# Patient Record
Sex: FEMALE | Race: WHITE | URBAN - METROPOLITAN AREA
[De-identification: names, ages, dates, MRNs, and addresses within clinical notes are randomized per-mention and may not be internally consistent; named-entity substitution may affect disease eponyms.]

---

## 2017-06-22 PROBLEM — Z00.00 ENCOUNTER FOR PREVENTIVE HEALTH EXAMINATION: Status: ACTIVE | Noted: 2017-06-22

## 2017-06-28 ENCOUNTER — OUTPATIENT (OUTPATIENT)
Dept: OUTPATIENT SERVICES | Facility: HOSPITAL | Age: 47
LOS: 1 days | End: 2017-06-28
Payer: COMMERCIAL

## 2017-06-28 PROCEDURE — 70551 MRI BRAIN STEM W/O DYE: CPT | Mod: 26

## 2017-06-29 ENCOUNTER — APPOINTMENT (OUTPATIENT)
Dept: CARDIOLOGY | Facility: CLINIC | Age: 47
End: 2017-06-29

## 2017-07-11 ENCOUNTER — APPOINTMENT (OUTPATIENT)
Dept: CARDIOLOGY | Facility: CLINIC | Age: 47
End: 2017-07-11

## 2017-07-17 ENCOUNTER — APPOINTMENT (OUTPATIENT)
Dept: CARDIOLOGY | Facility: CLINIC | Age: 47
End: 2017-07-17

## 2017-08-04 ENCOUNTER — APPOINTMENT (OUTPATIENT)
Dept: CARDIOLOGY | Facility: CLINIC | Age: 47
End: 2017-08-04

## 2018-04-06 ENCOUNTER — OUTPATIENT (OUTPATIENT)
Dept: OUTPATIENT SERVICES | Facility: HOSPITAL | Age: 48
LOS: 1 days | End: 2018-04-06
Payer: COMMERCIAL

## 2018-04-06 PROCEDURE — 76770 US EXAM ABDO BACK WALL COMP: CPT | Mod: 26

## 2018-04-06 PROCEDURE — 76856 US EXAM PELVIC COMPLETE: CPT | Mod: 26

## 2018-10-09 ENCOUNTER — OUTPATIENT (OUTPATIENT)
Dept: OUTPATIENT SERVICES | Facility: HOSPITAL | Age: 48
LOS: 1 days | End: 2018-10-09
Payer: COMMERCIAL

## 2018-10-09 PROCEDURE — 76770 US EXAM ABDO BACK WALL COMP: CPT | Mod: 26

## 2018-10-09 PROCEDURE — 76856 US EXAM PELVIC COMPLETE: CPT | Mod: 26

## 2018-11-26 ENCOUNTER — OUTPATIENT (OUTPATIENT)
Dept: OUTPATIENT SERVICES | Facility: HOSPITAL | Age: 48
LOS: 1 days | End: 2018-11-26
Payer: COMMERCIAL

## 2018-11-26 PROCEDURE — 74178 CT ABD&PLV WO CNTR FLWD CNTR: CPT | Mod: 26

## 2018-12-13 ENCOUNTER — OUTPATIENT (OUTPATIENT)
Dept: OUTPATIENT SERVICES | Facility: HOSPITAL | Age: 48
LOS: 1 days | End: 2018-12-13
Payer: COMMERCIAL

## 2018-12-13 PROCEDURE — 76830 TRANSVAGINAL US NON-OB: CPT | Mod: 26

## 2018-12-13 PROCEDURE — 76856 US EXAM PELVIC COMPLETE: CPT | Mod: 26

## 2022-09-19 ENCOUNTER — NON-APPOINTMENT (OUTPATIENT)
Age: 52
End: 2022-09-19

## 2022-09-19 DIAGNOSIS — Z78.9 OTHER SPECIFIED HEALTH STATUS: ICD-10-CM

## 2022-09-19 DIAGNOSIS — Z83.3 FAMILY HISTORY OF DIABETES MELLITUS: ICD-10-CM

## 2022-09-19 DIAGNOSIS — E66.9 OBESITY, UNSPECIFIED: ICD-10-CM

## 2022-10-03 RX ORDER — METFORMIN HYDROCHLORIDE 1000 MG/1
1000 TABLET, COATED ORAL TWICE DAILY
Refills: 0 | Status: ACTIVE | COMMUNITY
Start: 2022-10-03

## 2022-10-03 RX ORDER — SIMVASTATIN 20 MG/1
20 TABLET, FILM COATED ORAL DAILY
Refills: 0 | Status: ACTIVE | COMMUNITY
Start: 2022-10-03

## 2022-10-03 RX ORDER — PIOGLITAZONE 30 MG/1
30 TABLET ORAL
Refills: 0 | Status: ACTIVE | COMMUNITY
Start: 2022-10-03

## 2022-10-03 RX ORDER — GLIMEPIRIDE 4 MG/1
4 TABLET ORAL TWICE DAILY
Refills: 0 | Status: ACTIVE | COMMUNITY
Start: 2022-10-03

## 2022-10-03 RX ORDER — LOSARTAN POTASSIUM 50 MG/1
50 TABLET, FILM COATED ORAL DAILY
Refills: 0 | Status: ACTIVE | COMMUNITY
Start: 2022-10-03

## 2022-10-04 ENCOUNTER — APPOINTMENT (OUTPATIENT)
Dept: ENDOCRINOLOGY | Facility: CLINIC | Age: 52
End: 2022-10-04

## 2022-10-04 VITALS
BODY MASS INDEX: 33.77 KG/M2 | WEIGHT: 210.13 LBS | HEART RATE: 68 BPM | DIASTOLIC BLOOD PRESSURE: 80 MMHG | SYSTOLIC BLOOD PRESSURE: 110 MMHG | HEIGHT: 66 IN

## 2022-10-04 DIAGNOSIS — E11.9 TYPE 2 DIABETES MELLITUS W/OUT COMPLICATIONS: ICD-10-CM

## 2022-10-04 PROCEDURE — 99213 OFFICE O/P EST LOW 20 MIN: CPT

## 2022-10-04 RX ORDER — METFORMIN HYDROCHLORIDE 1000 MG/1
1000 TABLET, COATED ORAL
Qty: 180 | Refills: 0 | Status: ACTIVE | COMMUNITY
Start: 2022-10-04 | End: 1900-01-01

## 2022-10-04 RX ORDER — PIOGLITAZONE HYDROCHLORIDE 30 MG/1
30 TABLET ORAL DAILY
Qty: 90 | Refills: 0 | Status: ACTIVE | COMMUNITY
Start: 2022-10-04 | End: 1900-01-01

## 2022-10-04 RX ORDER — GLIMEPIRIDE 4 MG/1
4 TABLET ORAL
Qty: 180 | Refills: 0 | Status: ACTIVE | COMMUNITY
Start: 2022-10-04 | End: 1900-01-01

## 2022-10-04 NOTE — ASSESSMENT
[Diabetes Foot Care] : diabetes foot care [Importance of Diet and Exercise] : importance of diet and exercise to improve glycemic control, achieve weight loss and improve cardiovascular health [Exercise/Effect on Glucose] : exercise/effect on glucose [Self Monitoring of Blood Glucose] : self monitoring of blood glucose [Hypoglycemia Management] : hypoglycemia management [FreeTextEntry1] : ASSESSMENT NOTES\par - Need to scan patients glucose tracker into file\par - BP is stable \par \par - Has slightly obese abdomen\par - Extremities are normal\par   parents have been staying with the patient for the past 3 months\par -Pt's glucometer  may not be working but needs to bring machine for Physician to inspect\par - Patient need prescription for blood test ([facility])\par - Needs the three antiidiabetic medications sent to [facility]\par \par VITALS\par - B.P. = 110/80 (good) - RUE / 110/70 - LUE (good)\par - H.R. = 68 bpm\par - Weight = 210.2 lbs (lost 5 pounds since last visit but needs to do better)\par - Height = 5'6" 3/4\par \par LABS SUMMARY\par Blood Test\par - Shows sugar was high at 7.8 (normal is <7) ; in April is was 6.9 so it went up 1%\par \par PLAN\par - Discussed with the patient the option of treatment and to add SGLT 2 inhibiters, but the patient is refusing that at present. She claimed that her sugar levels were higher than before due to the fact that her parents have been visiting her for the past 3 months.\par - She will continue on her current oral diabetic medications, with a strict compliance with diet and exercise\par - The importance of foot care and annual eye examination was discussed with the patient\par - Patient will have a repeat set of labs in about 3 months time and after that she will return to the office for a follow up visit.\par - Plan has been discussed with the patient..She is instructed to bring her glucometer on the next visit.

## 2022-10-04 NOTE — HISTORY OF PRESENT ILLNESS
[FreeTextEntry1] : ASSESSMENT\par - Metformin (1000 mg twice daily)\par - Glimepiride (4 mg twice daily)\par - Pioglitazone (30 mg once a day)\par - Simvastatin (80 mg once a day)\par - Losartan (100 mg once a day)\par \par - Patient is checking her sugar\par - Checks in the morning and night (2 times a day)\par - Patient skips days (every other day she is monitoring her sugar levels)\par - Did not bring monitor to appointment today\par \par AM Levels <100 mg/dL (after eating)\par PM Levels <120 mg/dl (after dinner)\par \par - Feels good overall\par - watches diet\par - walks for exercise\par - Has lost a little weight since her last visit\par - Appetite is normal\par - Working from 8-3 PM\par \par - Eyes are okay\par - Some knee pains, but no feet pains\par \par - No chest pains or shortness of breath\par - no nausea or vomiting\par \par - Goes to the bathroom 1-2 times a night\par - 5 x 16 oz water bottles a day (~80 oz of water)\par \par - Patient does not have insurance (prefers to stay with the current medication because she does not have any insurance) ; she is on the maximum dose.\par  - parents have been staying with the patient for the past 3 months

## 2023-01-03 ENCOUNTER — APPOINTMENT (OUTPATIENT)
Dept: ENDOCRINOLOGY | Facility: CLINIC | Age: 53
End: 2023-01-03